# Patient Record
Sex: MALE | Race: WHITE
[De-identification: names, ages, dates, MRNs, and addresses within clinical notes are randomized per-mention and may not be internally consistent; named-entity substitution may affect disease eponyms.]

---

## 2019-01-25 ENCOUNTER — HOSPITAL ENCOUNTER (EMERGENCY)
Dept: HOSPITAL 10 - FTE | Age: 39
Discharge: HOME | End: 2019-01-25
Payer: MEDICAID

## 2019-01-25 ENCOUNTER — HOSPITAL ENCOUNTER (EMERGENCY)
Dept: HOSPITAL 91 - FTE | Age: 39
Discharge: HOME | End: 2019-01-25
Payer: MEDICAID

## 2019-01-25 VITALS — RESPIRATION RATE: 16 BRPM | SYSTOLIC BLOOD PRESSURE: 135 MMHG | DIASTOLIC BLOOD PRESSURE: 100 MMHG | HEART RATE: 99 BPM

## 2019-01-25 VITALS — BODY MASS INDEX: 40.38 KG/M2 | WEIGHT: 205.69 LBS | HEIGHT: 60 IN

## 2019-01-25 DIAGNOSIS — M54.5: Primary | ICD-10-CM

## 2019-01-25 PROCEDURE — 96372 THER/PROPH/DIAG INJ SC/IM: CPT

## 2019-01-25 PROCEDURE — 72100 X-RAY EXAM L-S SPINE 2/3 VWS: CPT

## 2019-01-25 PROCEDURE — 99284 EMERGENCY DEPT VISIT MOD MDM: CPT

## 2019-01-25 RX ADMIN — DEXAMETHASONE SODIUM PHOSPHATE 1 MG: 10 INJECTION, SOLUTION INTRAMUSCULAR; INTRAVENOUS at 11:08

## 2019-01-25 RX ADMIN — KETOROLAC TROMETHAMINE 1 MG: 30 INJECTION, SOLUTION INTRAMUSCULAR at 11:08

## 2019-01-25 RX ADMIN — METHOCARBAMOL 1 MG: 750 TABLET ORAL at 11:08

## 2019-01-25 NOTE — ERD
ER Documentation


Chief Complaint


Chief Complaint





LEFT LOWER BACK PAIN RADIATING TO LEFT LEG





HPI


38-year-old male presents for left low back pain with radiation to the left leg 


times 1 day.  Patient was working on some drywall and states that he turned a 


certain way and began feeling the pain.  He thought that the pain would improve 


however it did not.  The left leg radiation close to the thigh.  Pain is 


described as sharp, worse with walking. He took Tylenol without relief.  Denies 


loss of bowel or bladder function.





ROS


All systems reviewed and are negative except as per history of present illness.





Medications


Home Meds


Active Scripts


Methocarbamol* (Robaxin*) 750 Mg Tablet, 750 MG PO TID for back pain, #30 TAB


   Prov:REJIMAKAYLA          1/25/19


Hydrocodone/Acetaminophen (Norco 5-325 Tablet) 1 Each Tablet, 1 TAB PO Q6H PRN 


for PAIN, #10 TAB


   Prov:MAKAYLA MENDOZA DO         1/25/19


Ibuprofen* (Motrin*) 600 Mg Tab, 600 MG PO Q6H PRN for PAIN AND OR ELEVATED 


TEMP, #30 TAB


   Prov:MENDOZAMAKAYLA HANLEY         1/25/19





Allergies


Allergies:  


Coded Allergies:  


     No Known Allergy (Unverified , 1/25/19)





PMhx/Soc


Medical and Surgical Hx:  pt denies Medical Hx, pt denies Surgical Hx


Hx Alcohol Use:  No


Hx Substance Use:  No


Hx Tobacco Use:  No


Smoking Status:  Never smoker





Physical Exam


Vitals


Vital Signs


  Date      Temp  Pulse  Resp  B/P (MAP)   Pulse Ox  O2          O2 Flow    FiO2


Time                                                 Delivery    Rate


   1/25/19  98.5     99    16     135/100        98


     09:54                          (112)





Physical Exam


Const:   No acute distress


Resp:   Clear to auscultation bilaterally


Cardio:   Regular rate and rhythm, no murmurs, bilateral cells pedis pulses


Abd:    Soft, non tender, non distended. Normal bowel sounds


Skin:   No petechiae or rashes


Back:   Tenderness on palpation over the lumbar spine left paravertebral muscle


Ext:    No cyanosis, or edema


Neur:   Awake and alert, bilateral lower extremity sensation intact


Psych:    Normal Mood and Affect


Results 24 hrs





Current Medications


 Medications
   Dose
          Sig/Niki
       Start Time
   Status  Last


 (Trade)       Ordered        Route
 PRN     Stop Time              Admin
Dose


                              Reason                                Admin


 Ketorolac
     30 mg          ONCE  STAT
    1/25/19       DC           1/25/19


Tromethamine
                 IM
            11:00
                       11:08



 (Toradol)                                   1/25/19 11:02


                10 mg          ONCE  ONCE
    1/25/19       DC           1/25/19


Dexamethasone                 IM
            11:00
                       11:08




  (Decadron)                                1/25/19 11:02


                750 mg         ONCE  ONCE
    1/25/19       DC           1/25/19


Methocarbamol                 PO
            11:00
                       11:08




  (Robaxin)                                 1/25/19 11:02








Procedures/MDM


Medical Decision Making:





Differential diagnosis includes but not limited to slipped disc, muscle strain, 


ligamentous sprain, fracture, dislocation





Patient appeared well on physical exam.


There is some tenderness palpation over the left paravertebral muscles over the 


lumbar spine


Patient was neurovascularly intact





Lumbar x-ray was unremarkable for acute fracture.





Patient likely has a muscle strain.








ED course:


Patient was given Toradol, Robaxin, Decadron.


Symptoms improved with treatment.





Prescription(s):


Patient given prescription for Motrin, Norco, Robaxin.





Patient advised to follow up with PCP in 1-2 days. Patient advised to return to 


ED for new or worsening symptoms. Patient stable on discharge from the ED.





The patient has been prescribed Norco during this encounter.





The patient has been warned about the use of narcotics. The patient should not 


drive or operate heavy machinery while taking this medication. The patient was 


also warned about the addictive properties of narcotic medications. 





[Narcan prescription was NOT provided given the following criteria





1. No more than 5 tablets of Norco 10 mg or 10 tablets of Norco 5 mg were 


prescribed.





2. Concomitant opiate and benzodiazepine prescriptions were not provided.





3. There is no obvious evidence of prior history of opiate abuse or overdose.]








Disclaimer: Inadvertent spelling and grammatical errors are likely due to 


EHR/dictation software use and do not reflect on the overall quality of patient 


care. Also, please note that the electronic time recorded on this note does not 


necessarily reflect the actual time of the patient encounter.





Departure


Diagnosis:  


   Primary Impression:  


   Back pain


Condition:  Fair


Patient Instructions:  Back Pain W/ Sciatica


Referrals:  


[facility]


YOU HAVE RECEIVED A MEDICAL SCREENING EXAM AND THE RESULTS INDICATE THAT YOU DO 


NOT HAVE A CONDITION THAT REQUIRES URGENT TREATMENT IN THE EMERGENCY DEPARTMENT.





FURTHER EVALUATION AND TREATMENT OF YOUR CONDITION CAN WAIT UNTIL YOU ARE SEEN 


IN YOUR DOCTORS OFFICE WITHIN THE NEXT 1-2 DAYS. IT IS YOUR RESPONSIBILITY TO 


MAKE AN APPOINTMENT FOR FOLOW-UP CARE.





IF YOU HAVE A PRIMARY DOCTOR


--you should call your primary doctor and schedule an appointment





IF YOU DO NOT HAVE A PRIMARY DOCTOR YOU CAN CALL OUR PHYSICIAN REFERRAL HOTLINE 


AT


 [phone] 





IF YOU CAN NOT AFFORD TO SEE A PHYSICIAN YOU CAN CHOSE FROM THE FOLLOWING 


[facility]





[facility] [phone] [address] [facility] [phone] [facility] [phone] [facility] [phone] [facility] [phone] [address] [facility] [address] [facility]. [phone] [address]





Additional Instructions:  


Llame al doctor MAANA y jemima torsten MARTA PARA DENTRO DE 1-2 RIVERA.Dgale a la 


secretaria que nosotros le instruimos hacer esta marta.Avise o llame si gilliam 


condicin se empeora antes de la marta. Regresa aqui si peor o no mejor.











MAKAYLA MENDOZA DO                 Jan 25, 2019 18:27